# Patient Record
(demographics unavailable — no encounter records)

---

## 2018-12-05 NOTE — US
Date of service: 



12/05/2018



PROCEDURE:  OB Pelvic Ultrasound



HISTORY:

Vaginal bleeding in pregnancy



COMPARISON:

None available.



FINDINGS:



UTERUS:

Single intrauterine gestational sac.



Gestational sac diameter measures 0.5 cm too small to characterize 

gestational age. 



Fetal pole and yolk sac are not visualized. 



Sveta-gestational hemorrhage: None.



Uterus measures 9.5 x 5.1 x 7.3 cm.  There is a 1.7 x 1.7 x 1.4 cm 

intramural fundal fibroid on the right and 8 x 5 x 6 mm calcified 

fibroid along the right lateral wall.



CERVIX:

Long and closed. No cervical abnormality seen.



RIGHT OVARY:

Measures 2.9 x 1.9 x 2.7 cm. No mass. Normal flow. There is a 1.8 x 

0.8 x 1.3 cm corpus luteum cyst.



LEFT OVARY:

Measures 2.6 x 1.5 x 2.3 cm. No mass. Normal flow. 



FREE FLUID:

None.



OTHER FINDINGS:

None. 



IMPRESSION:

Single intrauterine gestational sac too small to characterize 

gestational age.  Fetal pole and yolk sac are not visualized on the 

current examination.  Clinical and ultrasound follow-up is 

recommended to assess viability.



1.7 cm intramural fundal fibroid and 8 mm calcified fibroid along the 

right lateral wall.

## 2018-12-05 NOTE — ED PDOC
HPI: Female  Pain


Time Seen by Provider: 18 14:37


Chief Complaint (Nursing): Female Genitourinary


Chief Complaint (Provider): Vaginal bleeding in pregnancy 


History Per: Patient


History/Exam Limitations: no limitations


Onset/Duration Of Symptoms: Days


Current Symptoms Are (Timing): Still Present


Additional Complaint(s): 





36 yo female,  presents at 5 weeks gestion (LMP 10/26/18) for evaluation of 

suprapubic cramping and spotting. PT states she sees red blood coming from the 

vaginal when she wipes herself after urination. Pt states she has not made 

appointment with OB yet because her positive pregnancy test was only w week ago.

No fever/chills. 





Past Medical History


Reviewed: Historical Data, Nursing Documentation, Vital Signs


Vital Signs: 





                                Last Vital Signs











Temp  98.6 F   18 13:44


 


Pulse  85   18 13:44


 


Resp  16   18 13:44


 


BP  140/72   18 13:44


 


Pulse Ox  99   18 13:44














- Medical History


PMH: No Chronic Diseases





- Surgical History


Surgical History: Cholecystectomy





- Family History


Family History: States: No Known Family Hx





- Living Arrangements


Living Arrangements: With Family





- Social History


Current smoker - smoking cessation education provided: No





- Allergies


Allergies/Adverse Reactions: 


                                    Allergies











Allergy/AdvReac Type Severity Reaction Status Date / Time


 


No Known Allergies Allergy   Verified 18 13:44














Review of Systems


ROS Statement: Except As Marked, All Systems Reviewed And Found Negative


Constitutional: Negative for: Fever, Chills


Gastrointestinal: Positive for: Abdominal Pain.  Negative for: Nausea, Vomiting


Genitourinary Female: Positive for: Vaginal Bleeding, Pelvic Pain.  Negative 

for: Dysuria, Frequency, Vaginal Discharge





Physical Exam





- Reviewed


Nursing Documentation Reviewed: Yes


Vital Signs Reviewed: Yes





- Physical Exam


Appears: Positive for: Well, Non-toxic, No Acute Distress


Head Exam: Positive for: ATRAUMATIC, NORMAL INSPECTION, NORMOCEPHALIC


Skin: Positive for: Normal Color, Warm, DRY


Eye Exam: Positive for: Normal appearance


ENT: Positive for: Normal ENT Inspection


Neck: Positive for: Normal, Painless ROM


Cardiovascular/Chest: Positive for: Regular Rate, Rhythm


Respiratory: Positive for: Normal Breath Sounds.  Negative for: Accessory Muscle

Use, Respiratory Distress


Gastrointestinal/Abdominal: Positive for: Normal Exam, Soft.  Negative for: 

Tenderness, Guarding, Rebound


Back: Positive for: Normal Inspection


Extremity: Positive for: Normal ROM


Neurologic/Psych: Positive for: Alert, Oriented





- Laboratory Results


Result Diagrams: 


                                 18 16:06





                                 18 16:06





- ECG


O2 Sat by Pulse Oximetry: 99


Pulse Ox Interpretation: Normal





Medical Decision Making


Medical Decision Making: 





Beta [id number] 





US with gestational sac too small to characterize. 





Discussed early pregnancy with patient and f/u in 2 days with OB for repeat 

labs. 





Disposition





- Clinical Impression


Clinical Impression: 


 Bleeding in early pregnancy








- Patient ED Disposition


Is Patient to be Admitted: No


Counseled Patient/Family Regarding: Diagnosis, Need For Followup





- Disposition


Referrals: 


Ana Laura Yuan MD [Staff Provider] - 


Disposition: Routine/Home


Disposition Time: 19:06


Condition: GOOD


Additional Instructions: 


Please begin prenatals. Please follow-up with OB/GYN in 2-3 days. 


Instructions:  Bleeding With Pregnancy


Forms:  [facility] (English)


Print Language: [place]